# Patient Record
(demographics unavailable — no encounter records)

---

## 2017-11-14 NOTE — XMS
Clinical Summary

 Created on:2017



Patient:Sondra Mosher

Sex:Female

:1958

External Reference #:[id number]





Demographics







 Address  [address] [place] [id number]

 

 Home Phone  [phone]

 

 Preferred Language  Unknown

 

 Marital Status  Unknown

 

 Congregational Affiliation  Unknown

 

 Race  Unknown

 

 Ethnic Group  Unknown









Author







 Organization  [facility]

 

 Address  [address] [place] [address]

 

 Phone  [phone]









Care Team Providers







 Name  Role  Phone

 

 ,  Primary Care Provider  Unavailable









Allergies

Not on File



Current Medications

Not on file



Active Problems

Not on file



Social History







 Tobacco Use  Types  Packs/Day  Years Used  Date

 

 Never Assessed        









 Sex Assigned at Birth  Date Recorded

 

 Not on file  







Last Filed Vital Signs

Not on file



Plan of Treatment

Not on file



Results

Not on filefrom Last 3 Months

## 2017-11-14 NOTE — MMO
BILATERAL SCREENING MAMMOGRAM:

 

Date:  11/14/17 

 

COMPARISON:  

07/22/15 and 06/27/16. 

 

HISTORY:  

Screening mammography. 

 

FINDINGS:

 

This patient's mammogram was interpreted with the assistance of computer-aided detection.  

 

The breast parenchyma is heterogeneously dense. There is no dominant mass or architectural distortio
n. No concerning calcifications are noted. 

 

IMPRESSION: 

 

BIRADS 1:  Negative

 

Annual screening mammography recommended.  

 

POS: GABBY

## 2018-12-13 NOTE — MRI
MRI BRAIN WITH AND WITHOUT CONTRAST:

 

Date:  12/13/18 

 

HISTORY:  

Headache. Symptoms x3 months. 

 

COMPARISON:  

None. 

 

TECHNIQUE:  

Brain MRI is performed with and without intravenous Gadolinium administration. Multisequential, multi
planar imaging is performed. 

 

FINDINGS:

No evidence of hemorrhage on the axial gradient echo sequence. Hypointensity noted left frontal regio
n, near the vertex, is presumed to be artifactual.

 

No parenchymal mass, mass effect, or midline shift. Brain volume, age-appropriate. Cortical gray-whit
e matter differentiation preserved. 

 

Ventricles and sulci are patent and symmetric. 

 

Central arterial flow-voids are maintained. Absent restricted diffusion. 

 

Adequate aeration of the sinuses and mastoid air cells. 

 

Calvarium has a normal T1 marrow signal intensity. Midline brain parenchymal structures are unremarka
ble. 

 

No pathologic enhancement of the brain parenchyma. 

 

IMPRESSION: 

1.  Absent restricted diffusion. No acute infarct. 

 

2.  No pathologic enhancement of the brain parenchyma. 

 

3.  Artifact involving the left frontal region, which is presumed to be due to something external to 
the patient. Correlate clinically. 

 

 

POS: GABBY